# Patient Record
Sex: FEMALE | Race: WHITE | NOT HISPANIC OR LATINO | Employment: FULL TIME | URBAN - METROPOLITAN AREA
[De-identification: names, ages, dates, MRNs, and addresses within clinical notes are randomized per-mention and may not be internally consistent; named-entity substitution may affect disease eponyms.]

---

## 2017-06-19 ENCOUNTER — OFFICE VISIT - HEALTHEAST (OUTPATIENT)
Dept: FAMILY MEDICINE | Facility: CLINIC | Age: 57
End: 2017-06-19

## 2017-06-19 DIAGNOSIS — Z00.00 PHYSICAL EXAM: ICD-10-CM

## 2017-06-19 ASSESSMENT — MIFFLIN-ST. JEOR: SCORE: 1142.2

## 2018-03-07 ENCOUNTER — COMMUNICATION - HEALTHEAST (OUTPATIENT)
Dept: ADMINISTRATIVE | Facility: CLINIC | Age: 58
End: 2018-03-07

## 2018-06-21 ENCOUNTER — OFFICE VISIT - HEALTHEAST (OUTPATIENT)
Dept: FAMILY MEDICINE | Facility: CLINIC | Age: 58
End: 2018-06-21

## 2018-06-21 DIAGNOSIS — Z13.1 SCREENING FOR DIABETES MELLITUS: ICD-10-CM

## 2018-06-21 DIAGNOSIS — Z00.00 ROUTINE GENERAL MEDICAL EXAMINATION AT A HEALTH CARE FACILITY: ICD-10-CM

## 2018-06-21 DIAGNOSIS — Z12.11 SCREENING FOR COLON CANCER: ICD-10-CM

## 2018-06-21 DIAGNOSIS — Z13.220 SCREENING FOR LIPID DISORDERS: ICD-10-CM

## 2018-06-21 LAB
CHOLEST SERPL-MCNC: 197 MG/DL
FASTING STATUS PATIENT QL REPORTED: YES
FASTING STATUS PATIENT QL REPORTED: YES
GLUCOSE BLD-MCNC: 87 MG/DL (ref 70–99)
HDLC SERPL-MCNC: 56 MG/DL
LDLC SERPL CALC-MCNC: 120 MG/DL
TRIGL SERPL-MCNC: 107 MG/DL

## 2018-06-21 ASSESSMENT — MIFFLIN-ST. JEOR: SCORE: 1146.24

## 2018-06-22 ENCOUNTER — COMMUNICATION - HEALTHEAST (OUTPATIENT)
Dept: FAMILY MEDICINE | Facility: CLINIC | Age: 58
End: 2018-06-22

## 2019-02-11 ENCOUNTER — COMMUNICATION - HEALTHEAST (OUTPATIENT)
Dept: FAMILY MEDICINE | Facility: CLINIC | Age: 59
End: 2019-02-11

## 2019-06-26 ENCOUNTER — OFFICE VISIT - HEALTHEAST (OUTPATIENT)
Dept: FAMILY MEDICINE | Facility: CLINIC | Age: 59
End: 2019-06-26

## 2019-06-26 DIAGNOSIS — Z13.220 LIPID SCREENING: ICD-10-CM

## 2019-06-26 DIAGNOSIS — N92.0 EXCESSIVE OR FREQUENT MENSTRUATION: ICD-10-CM

## 2019-06-26 DIAGNOSIS — Z12.31 ENCOUNTER FOR SCREENING MAMMOGRAM FOR BREAST CANCER: ICD-10-CM

## 2019-06-26 DIAGNOSIS — Z12.11 SCREENING FOR COLON CANCER: ICD-10-CM

## 2019-06-26 DIAGNOSIS — T78.40XD ALLERGIC STATE, SUBSEQUENT ENCOUNTER: ICD-10-CM

## 2019-06-26 DIAGNOSIS — Z00.00 ROUTINE GENERAL MEDICAL EXAMINATION AT A HEALTH CARE FACILITY: ICD-10-CM

## 2019-06-26 LAB
ALBUMIN SERPL-MCNC: 4.3 G/DL (ref 3.5–5)
ALP SERPL-CCNC: 100 U/L (ref 45–120)
ALT SERPL W P-5'-P-CCNC: 36 U/L (ref 0–45)
ANION GAP SERPL CALCULATED.3IONS-SCNC: 11 MMOL/L (ref 5–18)
AST SERPL W P-5'-P-CCNC: 25 U/L (ref 0–40)
BILIRUB SERPL-MCNC: 0.5 MG/DL (ref 0–1)
BUN SERPL-MCNC: 17 MG/DL (ref 8–22)
CALCIUM SERPL-MCNC: 9.9 MG/DL (ref 8.5–10.5)
CHLORIDE BLD-SCNC: 105 MMOL/L (ref 98–107)
CHOLEST SERPL-MCNC: 194 MG/DL
CO2 SERPL-SCNC: 25 MMOL/L (ref 22–31)
CREAT SERPL-MCNC: 0.72 MG/DL (ref 0.6–1.1)
ERYTHROCYTE [DISTWIDTH] IN BLOOD BY AUTOMATED COUNT: 12.7 % (ref 11–14.5)
FASTING STATUS PATIENT QL REPORTED: YES
GFR SERPL CREATININE-BSD FRML MDRD: >60 ML/MIN/1.73M2
GLUCOSE BLD-MCNC: 82 MG/DL (ref 70–125)
HCT VFR BLD AUTO: 42 % (ref 35–47)
HDLC SERPL-MCNC: 64 MG/DL
HGB BLD-MCNC: 14 G/DL (ref 12–16)
LDLC SERPL CALC-MCNC: 117 MG/DL
MCH RBC QN AUTO: 29 PG (ref 27–34)
MCHC RBC AUTO-ENTMCNC: 33.3 G/DL (ref 32–36)
MCV RBC AUTO: 87 FL (ref 80–100)
PLATELET # BLD AUTO: 231 THOU/UL (ref 140–440)
PMV BLD AUTO: 8.6 FL (ref 7–10)
POTASSIUM BLD-SCNC: 4.3 MMOL/L (ref 3.5–5)
PROT SERPL-MCNC: 6.9 G/DL (ref 6–8)
RBC # BLD AUTO: 4.82 MILL/UL (ref 3.8–5.4)
SODIUM SERPL-SCNC: 141 MMOL/L (ref 136–145)
TRIGL SERPL-MCNC: 65 MG/DL
WBC: 3.7 THOU/UL (ref 4–11)

## 2019-06-26 RX ORDER — EPINEPHRINE 0.3 MG/.3ML
0.3 INJECTION SUBCUTANEOUS PRN
Qty: 2 | Refills: 0 | Status: SHIPPED | OUTPATIENT
Start: 2019-06-26

## 2019-06-26 ASSESSMENT — MIFFLIN-ST. JEOR: SCORE: 1117.61

## 2019-06-27 ENCOUNTER — COMMUNICATION - HEALTHEAST (OUTPATIENT)
Dept: FAMILY MEDICINE | Facility: CLINIC | Age: 59
End: 2019-06-27

## 2019-07-17 ENCOUNTER — COMMUNICATION - HEALTHEAST (OUTPATIENT)
Dept: ADMINISTRATIVE | Facility: CLINIC | Age: 59
End: 2019-07-17

## 2019-09-11 ENCOUNTER — RECORDS - HEALTHEAST (OUTPATIENT)
Dept: ADMINISTRATIVE | Facility: OTHER | Age: 59
End: 2019-09-11

## 2020-08-06 ENCOUNTER — OFFICE VISIT - HEALTHEAST (OUTPATIENT)
Dept: FAMILY MEDICINE | Facility: CLINIC | Age: 60
End: 2020-08-06

## 2020-08-06 DIAGNOSIS — M25.561 ACUTE PAIN OF RIGHT KNEE: ICD-10-CM

## 2020-10-29 ENCOUNTER — RECORDS - HEALTHEAST (OUTPATIENT)
Dept: MAMMOGRAPHY | Facility: CLINIC | Age: 60
End: 2020-10-29

## 2020-10-29 DIAGNOSIS — Z12.31 ENCOUNTER FOR SCREENING MAMMOGRAM FOR MALIGNANT NEOPLASM OF BREAST: ICD-10-CM

## 2021-05-26 VITALS
HEART RATE: 73 BPM | SYSTOLIC BLOOD PRESSURE: 143 MMHG | RESPIRATION RATE: 18 BRPM | TEMPERATURE: 98.1 F | DIASTOLIC BLOOD PRESSURE: 73 MMHG | OXYGEN SATURATION: 100 %

## 2021-05-30 NOTE — PROGRESS NOTES
FEMALE PREVENTATIVE EXAM    Assessment and Plan:       Hollie was seen today for annual exam.    Diagnoses and all orders for this visit:    Routine general medical examination at a health care facility: Fasting labs done today.  -     Lipid Profile  -     Comprehensive Metabolic Panel  -     HM2(CBC w/o Differential)    Lipid screening  -     Lipid Profile    Menorrhagia: s/p hysterectomy. Has one ovary remaining. Denies any post-menopausal symptoms. She is not sexually active currently- denies any dyspareunia or other concerns.    Encounter for screening mammogram for breast cancer: Will schedule at her convenience after summer  -     Mammo Screening Bilateral; Future    Screening for colon cancer: Discussed cologuard vs colonoscopy. She will schedule colonoscopy at her convenience- after the summer   -     Ambulatory referral for Colonoscopy    Allergic state, subsequent encounter: Hx of wasp venom allergy- anaphylactic. Refilled epipen. Filled out camp paperwork.  -     EPINEPHrine (EPIPEN/ADRENACLICK/AUVI-Q) 0.3 mg/0.3 mL injection; Inject 0.3 mL (0.3 mg total) as directed as needed for anaphylaxis. Inject into thigh.          Next follow up: 1 year    Immunization Review  Adult Imm Review: No immunizations due today    I discussed the following with the patient:   Adult Healthy Living: Importance of regular exercise  Healthy nutrition  Getting adequate sleep  Stress management    I have had an Advance Directives discussion with the patient.    Subjective:   Chief Complaint: Hollie Turpin is an 58 y.o. female here for a preventative health visit.     HPI:  Here for annual physical and camp physical. She volunteers at [employer] each year. She has 3 adult children and lives with them and . She has no other concerns today.    Healthy Habits  Are you taking a daily aspirin? No  Do you typically exercising at least 40 min, 3-4 times per week?  Yes  Do you usually eat at least 4 servings of fruit and  "vegetables a day, include whole grains and fiber and avoid regularly eating high fat foods? Yes  Have you had an eye exam in the past two years? NO  Do you see a dentist twice per year? Yes  Do you have any concerns regarding sleep? No    Safety Screen  If you own firearms, are they secured in a locked gun cabinet or with trigger locks? Yes  Do you feel you are safe where you are living?: Yes (6/26/2019  1:15 PM)  Do you feel you are safe in your relationship(s)?: Yes (6/26/2019  1:15 PM)      Review of Systems:  Please see above.  The rest of the review of systems are negative for all systems.       Cancer Screening       Status Date      MAMMOGRAM Postponed 7/26/2019 Originally 11/2/2018. Patient Declined     Done 11/2/2016 MAMMO SCREENING BILATERAL     Patient has more history with this topic...    COLONOSCOPY Postponed 7/26/2019 Originally 8/19/2012. Patient Declined    PAP SMEAR This plan is no longer active.      Done 6/16/2010 GYNECOLOGIC CYTOLOGY (PAP SMEAR)          Patient Care Team:  Debbie Jenkins MD as PCP - General (Family Medicine)        History     Reviewed By Date/Time Sections Reviewed    Debbie Jenkins MD 6/26/2019  1:32 PM Family    Debbie Jenkins MD 6/26/2019  1:31 PM Family    Debbie Jenkins MD 6/26/2019  1:28 PM Surgical    Debbie Jenkins MD 6/26/2019  1:27 PM Medical    Debbie Jenkins MD 6/26/2019  1:26 PM Medical    Renny Mosquera LPN 6/26/2019  1:15 PM Tobacco            Objective:   Vital Signs:   Visit Vitals  /60   Pulse 77   Temp 98.3  F (36.8  C) (Oral)   Resp 16   Ht 5' 0.24\" (1.53 m)   Wt 135 lb (61.2 kg)   SpO2 97%   BMI 26.16 kg/m           PHYSICAL EXAM  General: Alert and oriented, in NAD.  Eyes: PERRL, EOMI, sclera normal.  HENT: Normocephalic, no pharyngeal erythema, MMM.  Neck: Supple, no adenopathy.  Heart: Normal S1 and S2, regular rhythm. No murmurs, gallops, rubs.  Lungs: CTA bilaterally, no wheezes, no crackles, no rhonchi.  Abdomen: Soft, non-tender, " non-distended, BS+.   MSK: Normal ROM of upper and lower extremities.  Neuro: Alert and oriented x 3. Moves all extremities. No focal deficits noted.  Extremities: Peripheral pulses intact, no peripheral edema.  Skin: Warm and well perfused, no rashes noted.  : deferred per patient request.  Psych: Normal mood and affect.      The 10-year ASCVD risk score (Kvngemory SOSA Jr., et al., 2013) is: 1.8%    Values used to calculate the score:      Age: 58 years      Sex: Female      Is Non- : No      Diabetic: No      Tobacco smoker: No      Systolic Blood Pressure: 116 mmHg      Is BP treated: No      HDL Cholesterol: 56 mg/dL      Total Cholesterol: 197 mg/dL

## 2021-05-31 VITALS — HEIGHT: 60 IN | WEIGHT: 143 LBS | BODY MASS INDEX: 28.07 KG/M2

## 2021-06-01 VITALS — BODY MASS INDEX: 27.74 KG/M2 | HEIGHT: 60 IN | WEIGHT: 141.31 LBS

## 2021-06-03 VITALS — WEIGHT: 135 LBS | HEIGHT: 60 IN | BODY MASS INDEX: 26.5 KG/M2

## 2021-06-11 NOTE — PROGRESS NOTES
Subjective:   Trisha comes in today for a camp physical.  She will be going to a [employer] to assist with the camp.  She has no health issues or concerns at this point in time.  She denies any fevers, sweats or chills.  Her appetite is good.  She stays very active throughout the day.  She has never had a colonoscopy.  She is up-to-date on mammogram.  He brings in forms with her today that need to be completed for her participation in the camp.      Objective:  Please see physical exam form on the chart.      Assessment:  1.  Physical exam/camp physical      Plan:  The patient will have no restrictions at camp.  All forms are completed for her.  Immunizations are up-to-date.  I did discuss colonoscopy with the patient.  I encouraged her to get that.  She was given a Hemoccult ICT to use.  She will follow-up here as needed.

## 2021-06-18 NOTE — PATIENT INSTRUCTIONS - HE
Patient Instructions by Miguel Maldonado PA-C at 8/6/2020  5:20 PM     Author: Miguel Maldonado PA-C Service: -- Author Type: Physician Assistant    Filed: 8/6/2020  6:25 PM Encounter Date: 8/6/2020 Status: Addendum    : Miguel Maldonado PA-C (Physician Assistant)    Related Notes: Original Note by Miguel Maldonado PA-C (Physician Assistant) filed at 8/6/2020  6:24 PM       No fractures were seen on the x-rays.  You may follow-up with orthopedics for reevaluation re-x-ray after 12 to 14 days.  Use of over-the-counter Tylenol or ibuprofen if you do not have any indication to not use an NSAID.  Follow packaging directions.      Patient Education     Treatment for Bone Bruise (Bone Contusion)  A bone bruise is an injury to a bone that is less severe than a bone fracture. Bone bruises are fairly common. They can happen to people of all ages. Any type of bone in your body can get a bone bruise. Other injuries often happen along with a bone bruise, such as damage to nearby ligaments.  Types of treatment  Treatment for a bone bruise may include:    Resting the bone or joint    Putting an ice pack on the area several times a day    Raising the injury above the level of your heart to reduce swelling    Taking medicine to reduce pain and swelling    Wearing a brace or other device to limit movement  Your healthcare provider may give you advice about your diet. This is because eating a diet that is rich in calcium, vitamin D, and protein can help you heal. Your healthcare provider may ask you not to use certain over-the-counter medicines for pain. Some of these may delay normal bone healing. If you smoke, your healthcare provider will advise you to stop smoking. Smoking can also delay bone healing.  Your healthcare provider will tell you how long you should not put weight on your bone. Most bone bruises slowly heal over 2 to 4 months. A larger bone bruise may take longer to heal. You may not be able to return to sports activities  for weeks or months. If your symptoms dont go away, your healthcare provider may order an MRI.  Possible complications of a bone bruise  Most bone bruises heal without any problems. If your bone bruise is very large, your body may have trouble getting blood flow back to the area. This can cause avascular necrosis of the bone. This leads to death of that part of the bone.  When to call the healthcare provider  Call your healthcare provider if your symptoms dont start to get better in a few days. Call him or her right away if you have any severe symptoms, such as a high fever.  Date Last Reviewed: 5/1/2018 2000-2019 [employer]. [address] [place] [address], [place] [address]. All rights reserved. This information is not intended as a substitute for professional medical care. Always follow your healthcare professional's instructions.

## 2021-06-18 NOTE — PROGRESS NOTES
Assessment:Plan     1. Routine general medical examination at a health care facility  Due for mammogram in 10/18  PE form scanned in chart.     2. Screening for colon cancer  - Occult Blood, Fecal; Future    3. Screening for lipid disorders  - Lipid Cascade    4. Screening for diabetes mellitus  - Glucose    Subjective:      Hollie Turpin is a 57 y.o. female who presents for an annual exam. The patient is not sexually active. The patient participates in regular exercise: yes. The patient reports that there is not domestic violence in her life.   Exercises regularlly.  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  H/o allergy to bee sting.  She will be going to [employer] to assist with the camp.  She does this every year.  No known contact with active TB or other infectious diseases.  No health concerns.       Healthy Habits:   Regular Exercise: Yes  Sunscreen Use: No  Healthy Diet: Yes  Dental Visits Regularly: Yes  Seat Belt: Yes  Sexually active: No  Self Breast Exam Monthly:Yes  Hemoccults: Yes  Flex Sig: No  Colonoscopy: No  Lipid Profile: Yes  Glucose Screen: Yes  Prevention of Osteoporosis: No  Last Dexa: No  Guns at Home:  Yes  Guns Safety Locks:  Yes and Gun safe/class:  Yes      Immunization History   Administered Date(s) Administered     Hep A, historic 2010, 2011     Tdap 2010     Immunization status: up to date and documented.    No exam data present    Gynecologic History  No LMP recorded. Patient has had a hysterectomy.  Contraception: status post hysterectomy/total  Last Pap: 2010. Results were: normal  Last mammogram: 2016. Results were: normal      OB History    Para Term  AB Living   3 3 3      SAB TAB Ectopic Multiple Live Births             # Outcome Date GA Lbr Chirag/2nd Weight Sex Delivery Anes PTL Lv   3 Term            2 Term            1 Term                   Current Outpatient Prescriptions   Medication Sig Dispense Refill     EPINEPHrine  "(EPIPEN/ADRENACLICK) 0.3 mg/0.3 mL injection Inject 0.3 mL (0.3 mg total) as directed as needed for anaphylaxis. Inject into thigh. 2 Pre-filled Pen Syringe 0     loratadine (CLARITIN) 10 mg tablet Take 10 mg by mouth daily.       triamcinolone (NASACORT) 55 mcg nasal inhaler 2 sprays into each nostril daily.       No current facility-administered medications for this visit.      Past Medical History:   Diagnosis Date     Fibrocystic breast      Past Surgical History:   Procedure Laterality Date     HYSTERECTOMY  2012     OOPHORECTOMY  2012    one ovary remains     Review of patient's allergies indicates no known allergies.  Family History   Problem Relation Age of Onset     Breast cancer Maternal Grandmother 71     Cancer Paternal Grandmother 80     lung     Social History     Social History     Marital status:      Spouse name: N/A     Number of children: N/A     Years of education: N/A     Occupational History     Not on file.     Social History Main Topics     Smoking status: Passive Smoke Exposure - Never Smoker     Smokeless tobacco: Never Used      Comment: Son smokes outside     Alcohol use No     Drug use: No     Sexual activity: Not Currently     Partners: Male     Other Topics Concern     Not on file     Social History Narrative       Review of Systems  Review of Systems        No acute fever or URI symptoms.  No CP, shortness of breath or palpitations.      Objective:         Vitals:    06/21/18 0810   BP: 118/64   Pulse: 60   Resp: 16   Temp: 97.9  F (36.6  C)   TempSrc: Oral   Weight: 141 lb 5 oz (64.1 kg)   Height: 5' 0.24\" (1.53 m)     Body mass index is 27.38 kg/(m^2).    Physical  Physical Exam     Gen - alert, orientated, NAD  Eyes - fundascopic exam limited by the undialated pupil but looks symmetric  ENT - oropharynx clear, TMs clear  Neck - supple, no palpable mass or lymphadenopathy  CV - RRR, no murmur  Breast - no dominant mass on either side, no axillary mass.  Resp - lungs CTA  Ab - " soft, nontender, no palpable mass or organomegaly   -  Deferred, S/P hysterectomy due to benign condition. No concerns.   Extrem - warm, no edema  Neuro - CN II-XII intact, strength, sensation, reflexes intact and symmetric  Skin - no rash.  No atypical appearing lesions seen.

## 2021-06-18 NOTE — LETTER
Letter by Miguel Cooley MD at      Author: Miguel Cooley MD Service: -- Author Type: --    Filed:  Encounter Date: 2/11/2019 Status: (Other)       February 18, 2019    Hollie Papi  [address] [address]      Dear Hollie,    [facility] staff was able to verify that you have not yet established care with a new healthcare provider.     As a reminder, Dr. Cooley has recently retired from the clinic.     Please contact the [facility], and a member of our clinical staff would be happy to assist you with scheduling an appointment to set up care with a new physician.     Please call [phone], and ask to schedule an establish care appointment with a new provider.     Thank you!

## 2021-06-19 NOTE — LETTER
Letter by Debbie Jenkins MD at      Author: Debbie Jenkins MD Service: -- Author Type: --    Filed:  Encounter Date: 7/17/2019 Status: (Other)        [facility] PATIENT ACCESS  [address]  [place] [address]  [phone]         Hollie Turpin  [address] [address]        07/17/19    Dear Hollie Turpin,     At [facility] we care about your health and well-being. Your primary care provider is committed to ensuring you receive high quality care and has chosen a network of specialists to assist in providing that care. Recently Dr. Jenkins referred you to Mammography for specialty care.       It is important to your overall health to follow through with the recommendation from your provider. Please call [phone] at your earliest convenience for assistance in scheduling an appointment.  If you have already scheduled this appointment, please disregard this notice.  Thank you for choosing [facility] for your healthcare needs.       Sincerely,       [facility] Specialty Scheduling

## 2021-06-19 NOTE — LETTER
Letter by Debbie Jenkins MD at      Author: Debbie Jenkins MD Service: -- Author Type: --    Filed:  Encounter Date: 6/27/2019 Status: (Other)         Hollie Turpin  [address] [address]             June 27, 2019         Dear Ms. Turpin,    Below are the results from your recent visit:    Resulted Orders   Lipid Profile   Result Value Ref Range    Triglycerides 65 <=149 mg/dL    Cholesterol 194 <=199 mg/dL    LDL Calculated 117 <=129 mg/dL    HDL Cholesterol 64 >=50 mg/dL    Patient Fasting > 8hrs? Yes    Comprehensive Metabolic Panel   Result Value Ref Range    Sodium 141 136 - 145 mmol/L    Potassium 4.3 3.5 - 5.0 mmol/L    Chloride 105 98 - 107 mmol/L    CO2 25 22 - 31 mmol/L    Anion Gap, Calculation 11 5 - 18 mmol/L    Glucose 82 70 - 125 mg/dL    BUN 17 8 - 22 mg/dL    Creatinine 0.72 0.60 - 1.10 mg/dL    GFR MDRD Af Amer >60 >60 mL/min/1.73m2    GFR MDRD Non Af Amer >60 >60 mL/min/1.73m2    Bilirubin, Total 0.5 0.0 - 1.0 mg/dL    Calcium 9.9 8.5 - 10.5 mg/dL    Protein, Total 6.9 6.0 - 8.0 g/dL    Albumin 4.3 3.5 - 5.0 g/dL    Alkaline Phosphatase 100 45 - 120 U/L    AST 25 0 - 40 U/L    ALT 36 0 - 45 U/L    Narrative    Fasting Glucose reference range is 70-99 mg/dL per  American Diabetes Association (ADA) guidelines.   HM2(CBC w/o Differential)   Result Value Ref Range    WBC 3.7 (L) 4.0 - 11.0 thou/uL    RBC 4.82 3.80 - 5.40 mill/uL    Hemoglobin 14.0 12.0 - 16.0 g/dL    Hematocrit 42.0 35.0 - 47.0 %    MCV 87 80 - 100 fL    MCH 29.0 27.0 - 34.0 pg    MCHC 33.3 32.0 - 36.0 g/dL    RDW 12.7 11.0 - 14.5 %    Platelets 231 140 - 440 thou/uL    MPV 8.6 7.0 - 10.0 fL       All of your results were normal.     Please call with questions or contact us using [email or web address].    Sincerely,        Electronically signed by Debbie Jenkins MD

## 2021-06-23 NOTE — TELEPHONE ENCOUNTER
Unable to leave voice message; mail box full.    Calling to offer assistance in scheduling Establish Care appointment with a Provider, as PCP recently retired.    An additional call will need to patient is needed.  Thank you.

## 2021-06-24 NOTE — TELEPHONE ENCOUNTER
CMT left message x 3. CMT unable to reach the patient. CMT closing encounter and sending letter per notes below.

## 2021-06-24 NOTE — TELEPHONE ENCOUNTER
CMT attempted to reach the patient x 2. The patient is unavailable by phone at this time. Please review the message thread below and advise the patient accordingly when he/she returns our clinical call. In addition to patient advising, please schedule the patient if necessary. CMT will attempt to reach the patient three times before closing the encounter, and sending letter per Josue protocol.

## 2021-06-24 NOTE — TELEPHONE ENCOUNTER
CMT attempted to reach the patient x 3. The patient is unavailable by phone at this time. CMT unable to reach the patient. CMT closing encounter and sending letter per notes below.

## 2021-06-29 ENCOUNTER — COMMUNICATION - HEALTHEAST (OUTPATIENT)
Dept: FAMILY MEDICINE | Facility: CLINIC | Age: 61
End: 2021-06-29

## 2021-06-29 NOTE — PROGRESS NOTES
Progress Notes by Miguel Maldonado PA-C at 2020  5:20 PM     Author: Miguel Maldonado PA-C Service: -- Author Type: Physician Assistant    Filed: 2020  6:49 PM Encounter Date: 2020 Status: Signed    : Miguel Maldonado PA-C (Physician Assistant)       Subjective:      Patient ID: Hollie Turpin is a 59 y.o. female.    Chief Complaint:    HPI  Hollie Turpin is a 59 y.o. female who presents today complaining of right-sided knee pain after a fall.  Patient fell 4 days ago on the right knee.  She has had pain on the medial aspect of the patella subsequent to the injury.  She has been weightbearing and ambulating.  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  She does have a small abrasion on the patella.  No other pain over the tibial plateau or into the hip or the ankle of the ipsilateral side.  No head neck back or contralateral left lower extremity injury to report.      Past Medical History:   Diagnosis Date   ? Fibrocystic breast        Past Surgical History:   Procedure Laterality Date   ?  SECTION     ? LAPAROSCOPIC TOTAL HYSTERECTOMY      for fibroids, menorrhagia   ? OOPHORECTOMY Left 2012    Still has R ovary       Family History   Problem Relation Age of Onset   ? Breast cancer Maternal Grandmother 71   ? Heart failure Maternal Grandmother    ? Cancer Paternal Grandmother 80        lung   ? Hyperlipidemia Mother    ? Hypertension Mother    ? Hypothyroidism Mother    ? Cerebral aneurysm Father    ? Parkinsonism Maternal Aunt        Social History     Tobacco Use   ? Smoking status: Passive Smoke Exposure - Never Smoker   ? Smokeless tobacco: Never Used   ? Tobacco comment: Son smokes outside   Substance Use Topics   ? Alcohol use: No   ? Drug use: No       Review of Systems  As above in HPI otherwise negative.    Objective:     /73   Pulse 73   Temp 98.1  F (36.7  C) (Oral)   Resp 18   SpO2 100%     Physical Exam  General:  Patient is resting comfortably no acute distress is afebrile  HEENT: Head is normocephalic atraumatic   eyes are PERRL EOMI sclera anicteric   Skin: Without rash non-diaphoretic  Musculoskeletal: Examination of the right lower extremity shows that the hip and ankle are nontender palpation full range of motion without effusion.  Attention is turned to the right knee.  The patella has a small 1 cm in diameter abrasion.  She also has tenderness over the mid to medial aspect of the patella.  There is no joint effusion patella is not ballotable.  Both active and passive range of motion with the knee is full to extension to 0 degrees of extension and 120 degrees of flexion.  Medial lateral collateral ligaments are intact to valgus and varus stressing respectively.  Anterior drawer sign is negative.    Imaging    Xr Knee Right Plus [place] Vw    Result Date: 8/6/2020  EXAM DATE:         08/06/2020 EXAM: X-RAY KNEE, RIGHT, 3 VIEWS LOCATION: [facility] DATE/TIME: 8/6/2020 6:00 PM INDICATION: right MCL sprain and right knee effsuion COMPARISON: None. IMPRESSION: Chronic appearing ossification anterior midline of the knee, possibly an osteochondral loose joint body. There is no evidence of an acute fracture or dislocation. Probable small joint effusion.       I personally reviewed and discussed findings with the patient.  My own personal interpretation and radiologist will over read x-rays          Assessment:     Procedures    The encounter diagnosis was Acute pain of right knee.    Plan:     1. Acute pain of right knee  XR Knee Right Plus [place] VW         Patient Instructions   No fractures were seen on the x-rays.  You may follow-up with orthopedics for reevaluation re-x-ray after 12 to 14 days.  Use of over-the-counter Tylenol or ibuprofen if you do not have any indication to not use an NSAID.  Follow packaging directions.      Patient Education     Treatment for Bone Bruise (Bone Contusion)  A bone  bruise is an injury to a bone that is less severe than a bone fracture. Bone bruises are fairly common. They can happen to people of all ages. Any type of bone in your body can get a bone bruise. Other injuries often happen along with a bone bruise, such as damage to nearby ligaments.  Types of treatment  Treatment for a bone bruise may include:    Resting the bone or joint    Putting an ice pack on the area several times a day    Raising the injury above the level of your heart to reduce swelling    Taking medicine to reduce pain and swelling    Wearing a brace or other device to limit movement  Your healthcare provider may give you advice about your diet. This is because eating a diet that is rich in calcium, vitamin D, and protein can help you heal. Your healthcare provider may ask you not to use certain over-the-counter medicines for pain. Some of these may delay normal bone healing. If you smoke, your healthcare provider will advise you to stop smoking. Smoking can also delay bone healing.  Your healthcare provider will tell you how long you should not put weight on your bone. Most bone bruises slowly heal over 2 to 4 months. A larger bone bruise may take longer to heal. You may not be able to return to sports activities for weeks or months. If your symptoms dont go away, your healthcare provider may order an MRI.  Possible complications of a bone bruise  Most bone bruises heal without any problems. If your bone bruise is very large, your body may have trouble getting blood flow back to the area. This can cause avascular necrosis of the bone. This leads to death of that part of the bone.  When to call the healthcare provider  Call your healthcare provider if your symptoms dont start to get better in a few days. Call him or her right away if you have any severe symptoms, such as a high fever.  Date Last Reviewed: 5/1/2018 2000-2019 [employer] [address], [place] [address]. All  rights reserved. This information is not intended as a substitute for professional medical care. Always follow your healthcare professional's instructions.

## 2021-07-21 ENCOUNTER — RECORDS - HEALTHEAST (OUTPATIENT)
Dept: ADMINISTRATIVE | Facility: CLINIC | Age: 61
End: 2021-07-21

## 2024-10-21 ENCOUNTER — OFFICE VISIT (OUTPATIENT)
Dept: FAMILY MEDICINE | Facility: CLINIC | Age: 64
End: 2024-10-21
Payer: COMMERCIAL

## 2024-10-21 VITALS
OXYGEN SATURATION: 100 % | DIASTOLIC BLOOD PRESSURE: 65 MMHG | HEART RATE: 72 BPM | SYSTOLIC BLOOD PRESSURE: 134 MMHG | RESPIRATION RATE: 20 BRPM | TEMPERATURE: 97.3 F

## 2024-10-21 DIAGNOSIS — S50.861A TICK BITE OF RIGHT FOREARM, INITIAL ENCOUNTER: Primary | ICD-10-CM
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
## 2024-10-21 PROCEDURE — 99203 OFFICE O/P NEW LOW 30 MIN: CPT | Performed by: FAMILY MEDICINE

## 2024-10-21 RX ORDER — DOXYCYCLINE 100 MG/1
200 CAPSULE ORAL ONCE
Qty: 2 CAPSULE | Refills: 0 | Status: SHIPPED | OUTPATIENT
Start: 2024-10-21 | End: 2024-10-21

## 2024-10-21 NOTE — PROGRESS NOTES
Assessment:       Tick bite of right forearm, initial encounter  - doxycycline hyclate (VIBRAMYCIN) 100 MG capsule  Dispense: 2 capsule; Refill: 0         Plan:     With engorged deer tick bite on her right forearm in endemic area for [place] disease.  Will treat with a prophylactic dose of doxycycline 200 mg orally once.  Watch for signs and symptoms of Lyme disease follow-up if developing any fevers, chills, migratory arthralgias, headache, nausea, fatigue, erythema migrans rash, etc.    MEDICATIONS:   Orders Placed This Encounter   Medications    doxycycline hyclate (VIBRAMYCIN) 100 MG capsule     Sig: Take 2 capsules (200 mg) by mouth once for 1 dose.     Dispense:  2 capsule     Refill:  0         Subjective:       64 year old female presents for evaluation after she pulled off a engorged deer tick from her right forearm yesterday.  Thinks the deer tick was attached for maybe 2 to 3 days.  There has been only some mild surrounding erythema at the site of the bite.  She denies fevers chills headache nausea vomiting abdominal pain or arthralgias.    Patient Active Problem List   Diagnosis    Allergic Rhinitis    Menorrhagia       Past Medical History:   Diagnosis Date    Fibrocystic breast     Fibrocystic breast        Past Surgical History:   Procedure Laterality Date     SECTION      HYSTERECTOMY      LAPAROSCOPIC HYSTERECTOMY TOTAL      for fibroids, menorrhagia    OOPHORECTOMY Left 2012    Still has R ovary       Current Outpatient Medications   Medication Sig Dispense Refill    doxycycline hyclate (VIBRAMYCIN) 100 MG capsule Take 2 capsules (200 mg) by mouth once for 1 dose. 2 capsule 0    EPINEPHrine (EPIPEN/ADRENACLICK/AUVI-Q) 0.3 mg/0.3 mL injection [EPINEPHRINE (EPIPEN/ADRENACLICK/AUVI-Q) 0.3 MG/0.3 ML INJECTION] Inject 0.3 mL (0.3 mg total) as directed as needed for anaphylaxis. Inject into thigh. 2 0    EPINEPHrine (EPIPEN/ADRENACLICK/AUVI-Q) 0.3 mg/0.3 mL injection [EPINEPHRINE  (EPIPEN/ADRENACLICK/AUVI-Q) 0.3 MG/0.3 ML INJECTION] Inject 0.3 mL (0.3 mg total) as directed as needed for anaphylaxis. Inject into thigh. 2 0    loratadine (CLARITIN) 10 mg tablet [LORATADINE (CLARITIN) 10 MG TABLET] Take 10 mg by mouth daily.      triamcinolone (NASACORT) 55 mcg nasal inhaler [TRIAMCINOLONE (NASACORT) 55 MCG NASAL INHALER] 2 sprays into each nostril daily.       No current facility-administered medications for this visit.       Allergies   Allergen Reactions    Wasp Venom Swelling       Family History   Problem Relation Age of Onset    Breast Cancer Maternal Grandmother 71.00    Heart Failure Maternal Grandmother     Cancer Paternal Grandmother 80.00        lung    Hyperlipidemia Mother     Hypertension Mother     Hypothyroidism Mother     Cerebral aneurysm Father     Parkinsonism Maternal Aunt        Social History     Socioeconomic History    Marital status:      Spouse name: None    Number of children: None    Years of education: None    Highest education level: None   Tobacco Use    Smoking status: Never     Passive exposure: Yes (son and  smokes outside)    Smokeless tobacco: Never   Vaping Use    Vaping status: Never Used   Substance and Sexual Activity    Alcohol use: No    Drug use: No    Sexual activity: Not Currently     Partners: Male         Review of Systems  Pertinent items are noted in HPI.      Objective:     /65   Pulse 72   Temp 97.3  F (36.3  C) (Tympanic)   Resp 20   SpO2 100%      General appearance: alert, appears stated age, and cooperative  Skin: Patient has site of deer tick bite on her right forearm with some mild surrounding erythema noted without tenderness or warmth.      This note has been dictated using voice recognition software. Any grammatical or context distortions are unintentional and inherent to the software